# Patient Record
Sex: FEMALE | Race: WHITE | Employment: STUDENT | URBAN - METROPOLITAN AREA
[De-identification: names, ages, dates, MRNs, and addresses within clinical notes are randomized per-mention and may not be internally consistent; named-entity substitution may affect disease eponyms.]

---
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
## 2022-03-08 ENCOUNTER — OFFICE VISIT (OUTPATIENT)
Dept: FAMILY MEDICINE | Facility: CLINIC | Age: 22
End: 2022-03-08
Payer: COMMERCIAL

## 2022-03-08 VITALS
BODY MASS INDEX: 23.56 KG/M2 | RESPIRATION RATE: 16 BRPM | DIASTOLIC BLOOD PRESSURE: 81 MMHG | TEMPERATURE: 97.3 F | SYSTOLIC BLOOD PRESSURE: 120 MMHG | HEART RATE: 66 BPM | WEIGHT: 138 LBS | OXYGEN SATURATION: 100 % | HEIGHT: 64 IN

## 2022-03-08 DIAGNOSIS — J02.9 SORE THROAT: Primary | ICD-10-CM

## 2022-03-08 LAB
DEPRECATED S PYO AG THROAT QL EIA: NEGATIVE
GROUP A STREP BY PCR: NOT DETECTED

## 2022-03-08 PROCEDURE — 87651 STREP A DNA AMP PROBE: CPT | Performed by: PHYSICIAN ASSISTANT

## 2022-03-08 PROCEDURE — 99213 OFFICE O/P EST LOW 20 MIN: CPT | Performed by: PHYSICIAN ASSISTANT

## 2022-03-08 RX ORDER — PREDNISONE 20 MG/1
20 TABLET ORAL 2 TIMES DAILY
Qty: 10 TABLET | Refills: 0 | Status: SHIPPED | OUTPATIENT
Start: 2022-03-08 | End: 2023-07-13

## 2022-03-08 RX ORDER — CETIRIZINE HYDROCHLORIDE 10 MG/1
10 TABLET ORAL DAILY
COMMUNITY
Start: 2022-03-08

## 2022-03-08 ASSESSMENT — PAIN SCALES - GENERAL: PAINLEVEL: NO PAIN (0)

## 2022-03-08 NOTE — PROGRESS NOTES
"    History of Present Illness       Reason for visit:  Tonsilitis  Symptom onset:  More than a month  Symptoms include:  Swollen tonsils  Symptom intensity:  Moderate  Symptom progression:  Worsening  Had these symptoms before:  Yes  Has tried/received treatment for these symptoms:  Yes  Previous treatment was successful:  Yes  Prior treatment description:  Steroidsp  What makes it worse:  No  What makes it better:  Steroids    She eats 2-3 servings of fruits and vegetables daily.She consumes 1 sweetened beverage(s) daily.She exercises with enough effort to increase her heart rate 60 or more minutes per day.  She exercises with enough effort to increase her heart rate 6 days per week.        Pt had strep 4 months ago and again two months ago  Also had strep in 2019  Had a sore throat last month; strep and mono and covid neg.  Has freq colds and sore throats over the winter    Past Medical History:   Diagnosis Date     Known health problems: none      No past surgical history on file.  Social History     Tobacco Use     Smoking status: Never Smoker     Smokeless tobacco: Never Used   Substance Use Topics     Alcohol use: Not Currently     Comment: Rare     Current Outpatient Medications   Medication Sig Dispense Refill     levonorgestrel (KYLEENA) 19.5 MG IUD 1 each by Intrauterine route       predniSONE (DELTASONE) 20 MG tablet Take 1 tablet (20 mg) by mouth 2 times daily 10 tablet 0     Allergies   Allergen Reactions     Penicillins Hives     FAMILY HISTORY NOTED AND REVIEWED  PHYSICAL EXAM:    /81 (BP Location: Right arm, Patient Position: Sitting, Cuff Size: Adult Regular)   Pulse 66   Temp 97.3  F (36.3  C) (Temporal)   Resp 16   Ht 1.615 m (5' 3.6\")   Wt 62.6 kg (138 lb)   SpO2 100%   BMI 23.99 kg/m      Patient appears non toxic  Ears: TMs pearly grey  Throat: erythematous, swollen tonsils without exudates. Erythema post oropharynx.  Neck: tender ant LA    Results for orders placed or performed " in visit on 03/08/22   Streptococcus A Rapid Screen w/Reflex to PCR - Clinic Collect     Status: Normal    Specimen: Throat; Swab   Result Value Ref Range    Group A Strep antigen Negative Negative         Assessment and Plan:     (J02.9) Sore throat  (primary encounter diagnosis)  Comment: pt have recurrent sore throats since age 18 and worse over the past 4 months. Recd she see ENT to discuss tonsillectomy . Referral placed.  She had a neg covid test 3 days ago so didn't repeat that. She had a neg mono test last month so didn't repeat that either.  She has a needle phobia.  Plan: Streptococcus A Rapid Screen w/Reflex to PCR -         Clinic Collect, predniSONE (DELTASONE) 20 MG bid x 5d to help with swelling in throat.        tablet, Otolaryngology Referral        Recd Sukhdev Paige/Huan Collins PA-C

## 2022-04-15 ENCOUNTER — HOSPITAL ENCOUNTER (OUTPATIENT)
Dept: BEHAVIORAL HEALTH | Facility: CLINIC | Age: 22
Discharge: HOME | End: 2022-04-15
Attending: FAMILY MEDICINE
Payer: COMMERCIAL

## 2022-04-15 PROCEDURE — 999N000216 HC STATISTIC ADULT CD FACE TO FACE-NO CHRG: Mod: GT,95 | Performed by: PSYCHOLOGIST

## 2022-04-15 ASSESSMENT — COLUMBIA-SUICIDE SEVERITY RATING SCALE - C-SSRS
1. IN THE PAST MONTH, HAVE YOU WISHED YOU WERE DEAD OR WISHED YOU COULD GO TO SLEEP AND NOT WAKE UP?: YES
5. HAVE YOU STARTED TO WORK OUT OR WORKED OUT THE DETAILS OF HOW TO KILL YOURSELF? DO YOU INTEND TO CARRY OUT THIS PLAN?: NO
2. HAVE YOU ACTUALLY HAD ANY THOUGHTS OF KILLING YOURSELF IN THE PAST MONTH?: NO
3. HAVE YOU BEEN THINKING ABOUT HOW YOU MIGHT KILL YOURSELF?: NO
4. HAVE YOU HAD THESE THOUGHTS AND HAD SOME INTENTION OF ACTING ON THEM?: NO
6. HAVE YOU EVER DONE ANYTHING, STARTED TO DO ANYTHING, OR PREPARED TO DO ANYTHING TO END YOUR LIFE?: NO

## 2022-04-15 ASSESSMENT — ANXIETY QUESTIONNAIRES
2. NOT BEING ABLE TO STOP OR CONTROL WORRYING: SEVERAL DAYS
IF YOU CHECKED OFF ANY PROBLEMS ON THIS QUESTIONNAIRE, HOW DIFFICULT HAVE THESE PROBLEMS MADE IT FOR YOU TO DO YOUR WORK, TAKE CARE OF THINGS AT HOME, OR GET ALONG WITH OTHER PEOPLE: SOMEWHAT DIFFICULT
1. FEELING NERVOUS, ANXIOUS, OR ON EDGE: NEARLY EVERY DAY
GAD7 TOTAL SCORE: 16
4. TROUBLE RELAXING: NEARLY EVERY DAY
7. FEELING AFRAID AS IF SOMETHING AWFUL MIGHT HAPPEN: NEARLY EVERY DAY
6. BECOMING EASILY ANNOYED OR IRRITABLE: MORE THAN HALF THE DAYS
3. WORRYING TOO MUCH ABOUT DIFFERENT THINGS: SEVERAL DAYS
5. BEING SO RESTLESS THAT IT IS HARD TO SIT STILL: NEARLY EVERY DAY

## 2022-04-15 ASSESSMENT — PATIENT HEALTH QUESTIONNAIRE - PHQ9: SUM OF ALL RESPONSES TO PHQ QUESTIONS 1-9: 19

## 2022-04-15 NOTE — PROGRESS NOTES
"[facility]    ADULT Mental Health Assessment Appointment Note    PATIENT'S NAME:  Vanda Collazo    Preferred Pronoun: she her  MRN: [id number]  YOB: 2000  Address:  [address] [place] [id number]  PREFERRED PHONE: [phone]  May we leave a referral related message: Yes    DATE OF SERVICE: 4/15/22  START TIME: 805  END TIME: 850 plus appointment search and scheduling  SERVICE MODALITY:  Video Visit:      Provider verified identity through the following two step process.  Patient provided:  Patient  and Patient address    Telemedicine Visit: The patient's condition can be safely assessed and treated via synchronous audio and visual telemedicine encounter.      Reason for Telemedicine Visit: Services only offered telehealth    Originating Site (Patient Location): Patient's home    Distant Site (Provider Location): [facility]    Consent:  The patient/guardian has verbally consented to: the potential risks and benefits of telemedicine (video visit) versus in person care; bill my insurance or make self-payment for services provided; and responsibility for payment of non-covered services.     Patient would like the video invitation sent by:  My Chart    Mode of Communication:  Video Conference via BeehiveID    As the provider I attest to compliance with applicable laws and regulations related to telemedicine.    Identifying Information:  Patient is a 21 year old, female .  Patient was referred for an assessment by self.  Patient attended the session alone. Patient identified their preferred language to be English. Patient reported they do not need the assistance of an  or other support involved in therapy.     Services Requested:   The reason for seeking services at this time is: \" depression and anxiety \"  Patient has not attempted to resolve these concerns in the past.  Patient does not have " legal concerns.    Mental Health:  Review of Symptoms per patient report:  Depression: Excessive or inappropriate guilt, Change in energy level, Difficulties concentrating, Change in appetite, Psychomotor slowing or agitation, Low self-worth, Irritability, Feeling sad, down, or depressed and Withdrawn  Lori: No Symptoms  Psychosis: No Symptoms  Anxiety: Excessive worry, Nervousness, Poor concentration and Irritability  Panic: had panic attacks in high school but they are rare now.  Post Traumatic Stress Disorder: Experienced traumatic event pt reports she had a traumatic event at the age of 18 and wants to wait to talk to her therapist about that  Eating Disorder: Excessive exercise and binging and purging for a six month period around the age of 18. denies current binge and purge but does engage in excessive exercise occasionally  ADD / ADHD: No symptoms  Conduct Disorder: No symptoms  Autism Spectrum Disorder: No symptoms  Obsessive Compulsive Disorder: No Symptoms    Substance Use:  Do you  have a history of alcohol or illicit drug use? Denies.  CAGE score of 0 and rare use of alcohol and rare use of marijuana.  Pt states she might drink a drink one time every three weeks and same with marijuana.  She denies use of all other chemicals.        Significant Losses / Trauma / Abuse / Neglect Issues:   Patient did not serve in the .  There are indications or report of significant loss, trauma, abuse or neglect issues related to: reports hx of a traumatic event age 18 but did not want to go into it today.  She will discuss with her provider. .  Concerns for possible neglect are not present.     Medical History:  Patient reports no current medical concerns.  Patient denies any issues with pain..   There are not significant appetite / nutritional concerns / weight changes.   Patient does report a history of head injury / trauma / cognitive impairment.  Had a concussion last year.      Current Mental Status Exam:    Appearance:  Appropriate    Eye Contact:  via video   Psychomotor:  Normal   Attitude / Demeanor: Cooperative  Interested Friendly  Speech Rate:  Normal/ Responsive  Volume:  Normal  volume  Language:  intact  Mood:   Normal  Affect:   Appropriate    Thought Content: Clear   Thought Process: Goal Directed  Logical     Associations:  No loosening of associations  Insight:   Good   Judgment:  Intact   Orientation:  All  Attention:  Good    Rating Scales:  PHQ9:    PHQ-9 SCORE 4/15/2022   PHQ-9 Total Score 19   ;    GAD7:    ROBERT-7 SCORE 4/15/2022   Total Score 16       Safety Assessment:   Current Safety Concerns:  [place] Suicide Severity Rating Scale (Lifetime/Recent)  [place] Suicide Severity Rating (Lifetime/Recent) 4/15/2022   Q1 Wished to be Dead (Past Month) yes   Q2 Suicidal Thoughts (Past Month) no   Q3 Suicidal Thought Method no   Q4 Suicidal Intent without Specific Plan no   Q5 Suicide Intent with Specific Plan no   Q6 Suicide Behavior (Lifetime) no   Within the Past 3 Months? no   Level of Risk per Screen low risk     Patient denies current homicidal ideation and behaviors.  Patient denies current self-injurious ideation and behaviors.    Patient denied risk behaviors associated with substance use.  Patient she reports a hx of zoning out when driving at times but feels this has improved associated with mental health symptoms.  Patient reports the following current concerns for their personal safety: None.  Patient reports there are  firearms in the house. The firearms are secured in a locked space.     Clinical Impressions:  Generalized Anxiety Disorder  A. Excessive anxiety and worry about a number of events or activities (such as work or school performance).   B. The person finds it difficult to control the worry.  C. Select 3 or more symptoms (required for diagnosis). Only one item is required in children.   - Restlessness or feeling keyed up or on edge.    - Being easily fatigued.    - Difficulty  concentrating or mind going blank.    - Irritability.    - Sleep disturbance (difficulty falling or staying asleep, or restless unsatisfying sleep).   D. The focus of the anxiety and worry is not confined to features of an Axis I disorder.  E. The anxiety, worry, or physical symptoms cause clinically significant distress or impairment in social, occupational, or other important areas of functioning.   F. The disturbance is not due to the direct physiological effects of a substance (e.g., a drug of abuse, a medication) or a general medical condition (e.g., hyperthyroidism) and does not occur exclusively during a Mood Disorder, a Psychotic Disorder, or a Pervasive Developmental Disorder. Major Depressive Disorder  CRITERIA (A-C) REPRESENT A MAJOR DEPRESSIVE EPISODE - SELECT THESE CRITERIA  A) Recurrent episode(s) - symptoms have been present during the same 2-week period and represent a change from previous functioning 5 or more symptoms (required for diagnosis)   - Depressed mood. Note: In children and adolescents, can be irritable mood.     - Diminished interest or pleasure in all, or almost all, activities.    - Fatigue or loss of energy.    - Feelings of worthlessness or inappropriate and excessive guilt.    - Diminished ability to think or concentrate, or indecisiveness.   B) The symptoms cause clinically significant distress or impairment in social, occupational, or other important areas of functioning  C) The episode is not attributable to the physiological effects of a substance or to another medical condition  D) The occurence of major depressive episode is not better explained by other thought / psychotic disorders  E) There has never been a manic episode or hypomanic episode     Pt reports a hx of binge eating but not current.  This can be evaluated further by her therapist.    Substantiation of Level of Care:  Pt is a 21 year old female with no prior hx of mental health treatment.  She reports onset of  depression and anxiety around age 18 but no family financial ability to pay so she has gone untreated until now having employment.  She graduated from high school and has a full time job.  She reports good family and friend support and continues to live with her parents so her costs are minimal.  Pt reports she is self referred and is specifically looking for therapy and medication.  She wants a female therapist.  She has no comorbid conditions.  She has observed fluctuations in her mood and occasional thoughts of 'negative self talk like I am a burden' but denies SI and denies any risk of harm concerns.  She has no hx of attempts and has never required acute level of care.  Her symptoms are significant as listed above, but she has lived with this level of sxs for three years and is now being proactive to seek help.  She is disclosing and unguarded and appears to be a good candidate for OP LOC.        Therapeutic Interventions Provided: supportive active listening, provided safety planning, explored alternatives, provided Psychoeducational support, emotional validation and encouraged social supports    Recommendations/Plan: recommend OP LOC with therapy and medication assessment.  Pt does not want to see her PCP for meds at this time.    Referrals:   [facility]  (these will be remote and they will send pt a link)  Intake appointments scheduled for  Medication management on April 26 @ 2:30 with Nasreen Ambriz NP  Therapy intake scheduled on May 11 @ 8am with Eunice Saldaña  Mental health clinic in [place], [place]  Located in: [facility]  Address: [facility] [address] [address]  Phone: [phone]    Joy Michael, [facility],  April 15, 2022  [facility]

## 2022-04-15 NOTE — PROGRESS NOTES
[facility]  - Wait list  [address], [address]  [facility]   [phone]      [facility]  Went to   Located in: [facility]  Address: [address], [place] [address]  Phone: [phone]      [facility] [place]  No providers open at this time  Located in: [facility]  Address: [address], [place] [address]  Areas served: [place] and nearby areas  Phone: [phone]      [facility]  Wait list only  Mental health clinic in [place], [place]  Located in: [facility]  Address: [address], [place] [address]  Phone: [phone]    Final appointments that were sent to pt via [employer] [facility]  (these will be remote and they will send pt a link)  Intake appointments scheduled for  Medication management on April 26 @ 2:30 with Nasreen Ambriz, NP  Therapy intake scheduled on May 11 @ 8am with Eunice Saldaña  Mental health clinic in [place], [place]  Located in: [facility]  Address: [address], [place] [address]  Phone: [phone]

## 2022-04-16 ASSESSMENT — ANXIETY QUESTIONNAIRES: GAD7 TOTAL SCORE: 16

## 2022-11-20 ENCOUNTER — HEALTH MAINTENANCE LETTER (OUTPATIENT)
Age: 22
End: 2022-11-20

## 2023-07-13 ENCOUNTER — OFFICE VISIT (OUTPATIENT)
Dept: OBGYN | Facility: CLINIC | Age: 23
End: 2023-07-13
Payer: COMMERCIAL

## 2023-07-13 VITALS
SYSTOLIC BLOOD PRESSURE: 118 MMHG | HEIGHT: 63 IN | BODY MASS INDEX: 26.4 KG/M2 | WEIGHT: 149 LBS | DIASTOLIC BLOOD PRESSURE: 74 MMHG

## 2023-07-13 DIAGNOSIS — Z11.8 SCREENING FOR CHLAMYDIAL DISEASE: ICD-10-CM

## 2023-07-13 DIAGNOSIS — Z20.2 EXPOSURE TO CHLAMYDIA: ICD-10-CM

## 2023-07-13 DIAGNOSIS — N89.8 VAGINAL DISCHARGE: ICD-10-CM

## 2023-07-13 DIAGNOSIS — Z11.3 SCREEN FOR STD (SEXUALLY TRANSMITTED DISEASE): Primary | ICD-10-CM

## 2023-07-13 PROCEDURE — 2894A MULTIPLEX VAGINAL PANEL BY PCR: CPT | Performed by: NURSE PRACTITIONER

## 2023-07-13 PROCEDURE — 0352U MULTIPLEX VAGINAL PANEL BY PCR: CPT | Performed by: NURSE PRACTITIONER

## 2023-07-13 PROCEDURE — 99213 OFFICE O/P EST LOW 20 MIN: CPT | Performed by: NURSE PRACTITIONER

## 2023-07-13 PROCEDURE — 87591 N.GONORRHOEAE DNA AMP PROB: CPT | Performed by: NURSE PRACTITIONER

## 2023-07-13 PROCEDURE — 87491 CHLMYD TRACH DNA AMP PROBE: CPT | Performed by: NURSE PRACTITIONER

## 2023-07-13 RX ORDER — DOXYCYCLINE 100 MG/1
100 CAPSULE ORAL 2 TIMES DAILY
Qty: 14 CAPSULE | Refills: 0 | Status: SHIPPED | OUTPATIENT
Start: 2023-07-13

## 2023-07-13 NOTE — PROGRESS NOTES
SUBJECTIVE:                                                   Vanda Collazo is a 22 year old female who presents to clinic today for the following health issue(s):  Patient presents with:  STD: Screening    HPI:  Patient here today with concerns of chlamydia exposure.  She received a phone call that her partner was diagnosed and treated with chlamydia.  She has a Kyleena IUD that is due for exchange next year but would like to do this sooner than later due to going back to school.  She has had some pelvic cramping but is also due for cycle.    She also has a history of BV and yeast and would like to be screened today.    Patient's last menstrual period was 06/15/2023.    Patient is sexually active, .  Using IUD for contraception.    reports that she has never smoked. She has never used smokeless tobacco.    STD testing offered?  Accepted    Health maintenance updated:  yes    Today's PHQ-2 Score:       3/8/2022     1:42 PM   PHQ-2 (  [employer])   Q1: Little interest or pleasure in doing things 1   Q2: Feeling down, depressed or hopeless 1   PHQ-2 Score 2   Q1: Little interest or pleasure in doing things Several days   Q2: Feeling down, depressed or hopeless Several days   PHQ-2 Score 2     Today's PHQ-9 Score:       4/15/2022     8:00 AM   PHQ-9 SCORE   PHQ-9 Total Score 19     Today's ROBERT-7 Score:       4/15/2022     8:00 AM   ROBERT-7 SCORE   Total Score 16       Problem list and histories reviewed & adjusted, as indicated.  Additional history: as documented.    Patient Active Problem List   Diagnosis     IUD (intrauterine device) in place     No past surgical history on file.   Social History     Tobacco Use     Smoking status: Never     Smokeless tobacco: Never   Substance Use Topics     Alcohol use: Not Currently     Comment: Rare      Problem (# of Occurrences) Relation (Name,Age of Onset)    Diabetes (2) Father, Paternal Grandfather    Hypertension (1) Paternal Grandfather    Coronary Artery Disease  "(1) Paternal Grandfather    Thyroid Cancer (1) Maternal Grandfather            Current Outpatient Medications   Medication Sig     cetirizine (ZYRTEC) 10 MG tablet Take 1 tablet (10 mg) by mouth daily     doxycycline hyclate (VIBRAMYCIN) 100 MG capsule Take 1 capsule (100 mg) by mouth 2 times daily     levonorgestrel (KYLEENA) 19.5 MG IUD 1 each by Intrauterine route     No current facility-administered medications for this visit.     Allergies   Allergen Reactions     Penicillins Hives       ROS:  12 point review of systems negative other than symptoms noted below or in the HPI.  Genitourinary: Vaginal Discharge  No urinary frequency or dysuria, bladder or kidney problems      OBJECTIVE:     /74   Ht 1.607 m (5' 3.25\")   Wt 67.6 kg (149 lb)   LMP 06/15/2023   BMI 26.19 kg/m    Body mass index is 26.19 kg/m .    Exam:  Constitutional:  Appearance: Well nourished, well developed alert, in no acute distress  Psychiatric:  Mentation appears normal and affect normal/bright.  Pelvic Exam:  External Genitalia:     Normal appearance for age, yellow discharge present, no tenderness present, no inflammatory lesions present, color normal  Vagina:    Normal vaginal vault without central or paravaginal defects,/yellow discharge present, no inflammatory lesions present, no masses present  Urethra:   Urethral Meatus:  No erythema or lesions present  Cervix:     Appearance healthy, no lesions present, nontender to palpation, no bleeding present, string present  Perineum:     Perineum within normal limits, no evidence of trauma, no rashes or skin lesions present  Anus:     Anus within normal limits, no hemorrhoids present  Inguinal Lymph Nodes:     No lymphadenopathy present  Pubic Hair:     Normal pubic hair distribution for age  Genitalia and Groin:     No rashes present, no lesions present, no areas of discoloration, no masses present       In-Clinic Test Results:  No results found for this or any previous visit (from " the past 24 hour(s)).    ASSESSMENT/PLAN:                                                        ICD-10-CM    1. Screen for STD (sexually transmitted disease)  Z11.3 NEISSERIA GONORRHOEA PCR      2. Screening for chlamydial disease  Z11.8 CHLAMYDIA TRACHOMATIS PCR      3. Exposure to chlamydia  Z20.2 doxycycline hyclate (VIBRAMYCIN) 100 MG capsule      4. Vaginal discharge  N89.8 Multiplex Vaginal Panel by PCR          There are no Patient Instructions on file for this visit.    22-year-old female with exposure to chlamydia.  We will treat her today and send for culture.  We will also test for yeast and BV and follow-up as appropriate.  We have encouraged her to schedule her IUD exchange on her way out.    Trish Patel, AMAYA CNP  [facility] [place]

## 2023-07-14 LAB
BACTERIAL VAGINOSIS VAG-IMP: NEGATIVE
CANDIDA DNA VAG QL NAA+PROBE: DETECTED
CANDIDA GLABRATA / CANDIDA KRUSEI DNA: NOT DETECTED
T VAGINALIS DNA VAG QL NAA+PROBE: NOT DETECTED

## 2023-07-14 RX ORDER — FLUCONAZOLE 150 MG/1
150 TABLET ORAL ONCE
Qty: 1 TABLET | Refills: 0 | Status: SHIPPED | OUTPATIENT
Start: 2023-07-14 | End: 2023-07-14

## 2023-07-15 LAB
C TRACH DNA SPEC QL NAA+PROBE: POSITIVE
N GONORRHOEA DNA SPEC QL NAA+PROBE: NEGATIVE

## 2023-08-09 ENCOUNTER — OFFICE VISIT (OUTPATIENT)
Dept: OBGYN | Facility: CLINIC | Age: 23
End: 2023-08-09
Payer: COMMERCIAL

## 2023-08-09 VITALS — SYSTOLIC BLOOD PRESSURE: 116 MMHG | BODY MASS INDEX: 26.54 KG/M2 | DIASTOLIC BLOOD PRESSURE: 68 MMHG | WEIGHT: 151 LBS

## 2023-08-09 DIAGNOSIS — N89.8 VAGINAL DISCHARGE: ICD-10-CM

## 2023-08-09 DIAGNOSIS — Z20.2 EXPOSURE TO CHLAMYDIA: Primary | ICD-10-CM

## 2023-08-09 LAB
BACTERIAL VAGINOSIS VAG-IMP: POSITIVE
CANDIDA DNA VAG QL NAA+PROBE: NOT DETECTED
CANDIDA GLABRATA / CANDIDA KRUSEI DNA: NOT DETECTED
T VAGINALIS DNA VAG QL NAA+PROBE: NOT DETECTED

## 2023-08-09 PROCEDURE — 87491 CHLMYD TRACH DNA AMP PROBE: CPT | Performed by: NURSE PRACTITIONER

## 2023-08-09 PROCEDURE — 0352U MULTIPLEX VAGINAL PANEL BY PCR: CPT | Performed by: NURSE PRACTITIONER

## 2023-08-09 PROCEDURE — 87591 N.GONORRHOEAE DNA AMP PROB: CPT | Performed by: NURSE PRACTITIONER

## 2023-08-09 PROCEDURE — 99213 OFFICE O/P EST LOW 20 MIN: CPT | Performed by: NURSE PRACTITIONER

## 2023-08-09 NOTE — PROGRESS NOTES
SUBJECTIVE:                                                   Vanda Collazo is a 22 year old female who presents to clinic today for the following health issue(s):  Patient presents with:  Follow Up      HPI:  Patient here today for chlamydia recheck.  She was tested positive and treated appropriately.  She was also found to have a yeast infection at the same time and we will retest today to be sure that that is cleared.  She does have her menstrual cycle which has been heavier and incredibly more painful than typical cycles.  She does have an IUD in place that is due to be exchanged in February.    Patient's last menstrual period was 06/15/2023..     Patient is sexually active, .  Using IUD for contraception.    reports that she has never smoked. She has never used smokeless tobacco.    STD testing offered?  Accepted    Health maintenance updated:  yes    Today's PHQ-2 Score:       2023     1:16 PM   PHQ-2 (  [employer])   Q1: Little interest or pleasure in doing things 0   Q2: Feeling down, depressed or hopeless 0   PHQ-2 Score 0   Q1: Little interest or pleasure in doing things Not at all   Q2: Feeling down, depressed or hopeless Not at all   PHQ-2 Score 0     Today's PHQ-9 Score:       4/15/2022     8:00 AM   PHQ-9 SCORE   PHQ-9 Total Score 19     Today's ROBERT-7 Score:       4/15/2022     8:00 AM   ROBERT-7 SCORE   Total Score 16       Problem list and histories reviewed & adjusted, as indicated.  Additional history: as documented.    Patient Active Problem List   Diagnosis    IUD (intrauterine device) in place     History reviewed. No pertinent surgical history.   Social History     Tobacco Use    Smoking status: Never    Smokeless tobacco: Never   Substance Use Topics    Alcohol use: Not Currently     Comment: Rare      Problem (# of Occurrences) Relation (Name,Age of Onset)    Diabetes (2) Father, Paternal Grandfather    Hypertension (1) Paternal Grandfather    Coronary Artery Disease (1) Paternal  Grandfather    Thyroid Cancer (1) Maternal Grandfather              Current Outpatient Medications   Medication Sig    cetirizine (ZYRTEC) 10 MG tablet Take 1 tablet (10 mg) by mouth daily    doxycycline hyclate (VIBRAMYCIN) 100 MG capsule Take 1 capsule (100 mg) by mouth 2 times daily    levonorgestrel (KYLEENA) 19.5 MG IUD 1 each by Intrauterine route     No current facility-administered medications for this visit.     Allergies   Allergen Reactions    Penicillins Hives       ROS:  12 point review of systems negative other than symptoms noted below or in the HPI.  No urinary frequency or dysuria, bladder or kidney problems      OBJECTIVE:     /68   Wt 68.5 kg (151 lb)   LMP 06/15/2023   BMI 26.54 kg/m    Body mass index is 26.54 kg/m .    Exam:  Constitutional:  Appearance: Well nourished, well developed alert, in no acute distress  Psychiatric:  Mentation appears normal and affect normal/bright.  Pelvic Exam:  External Genitalia:     Normal appearance for age, no discharge present, no tenderness present, no inflammatory lesions present, color normal  Vagina:    Normal vaginal vault without central or paravaginal defects, no discharge present, no inflammatory lesions present, no masses present.  Blood in vault  Urethra:   Urethral Meatus:  No erythema or lesions present  Cervix:     Appearance healthy, no lesions present, nontender to palpation, menstrual bleeding present, strings barely visualized due to their short length.  Possible at cervical os.   Perineum:     Perineum within normal limits, no evidence of trauma, no rashes or skin lesions present  Anus:     Anus within normal limits, no hemorrhoids present  Inguinal Lymph Nodes:     No lymphadenopathy present  Pubic Hair:     Normal pubic hair distribution for age  Genitalia and Groin:     No rashes present, no lesions present, no areas of discoloration, no masses present     In-Clinic Test Results:  No results found for this or any previous visit  (from the past 24 hour(s)).    ASSESSMENT/PLAN:                                                        ICD-10-CM    1. Exposure to chlamydia  Z20.2 NEISSERIA GONORRHOEA PCR     CHLAMYDIA TRACHOMATIS PCR      2. Vaginal discharge  N89.8 Multiplex Vaginal Panel by PCR          There are no Patient Instructions on file for this visit.    22-year-old female recheck for chlamydia.  Will also send her vaginal culture.    AMAYA Fierro CNP  [facility] [place]

## 2023-08-10 LAB
C TRACH DNA SPEC QL NAA+PROBE: NEGATIVE
N GONORRHOEA DNA SPEC QL NAA+PROBE: NEGATIVE

## 2023-08-10 RX ORDER — METRONIDAZOLE 500 MG/1
500 TABLET ORAL 2 TIMES DAILY
Qty: 14 TABLET | Refills: 0 | Status: SHIPPED | OUTPATIENT
Start: 2023-08-10 | End: 2023-08-17

## 2023-11-25 ENCOUNTER — HEALTH MAINTENANCE LETTER (OUTPATIENT)
Age: 23
End: 2023-11-25

## 2024-01-05 ENCOUNTER — TELEPHONE (OUTPATIENT)
Dept: MIDWIFE SERVICES | Facility: CLINIC | Age: 24
End: 2024-01-05
Payer: COMMERCIAL

## 2024-01-05 ENCOUNTER — MYC MEDICAL ADVICE (OUTPATIENT)
Dept: OBGYN | Facility: CLINIC | Age: 24
End: 2024-01-05
Payer: COMMERCIAL

## 2024-01-05 NOTE — TELEPHONE ENCOUNTER
Pt coming in for kyleena replacement  Routing pt Playfirehart message to provider to advise.  Binta Lugo RN on 1/5/2024 at 2:58 PM

## 2024-01-05 NOTE — TELEPHONE ENCOUNTER
Reason for call:  Other   Patient called regarding (reason for call): call back  Additional comments: patient would like medication prescribed before her IUD appointment on Tuesday 1/9. Said she had them last time she got her IUD placed but can't remember the names. Please call back.     Phone number to reach patient:  Cell number on file:    Telephone Information:   Mobile [phone]     Best Time:  any    Can we leave a detailed message on this number?  YES    Travel screening: Not Applicable

## 2024-01-06 NOTE — CONFIDENTIAL NOTE
[employer] message returned to Ani. Reviewed previous records, took Xanax and Ibuprofen prior to last IUD insertion. Discussed anti-anxiety is not necessary for IUD insertion but an option if she feels this would be beneficial (generally we Rx vistaril). If she would like an anti-anxiety medication she should let us know. Informed she will need a  if she takes anti-anxiety med    AMAYA Meraz, RUBÉN

## 2024-01-09 ENCOUNTER — OFFICE VISIT (OUTPATIENT)
Dept: MIDWIFE SERVICES | Facility: CLINIC | Age: 24
End: 2024-01-09
Payer: COMMERCIAL

## 2024-01-09 VITALS
HEIGHT: 63 IN | SYSTOLIC BLOOD PRESSURE: 108 MMHG | DIASTOLIC BLOOD PRESSURE: 66 MMHG | BODY MASS INDEX: 27.11 KG/M2 | WEIGHT: 153 LBS

## 2024-01-09 DIAGNOSIS — Z30.433 ENCOUNTER FOR REMOVAL AND REINSERTION OF INTRAUTERINE CONTRACEPTIVE DEVICE: Primary | ICD-10-CM

## 2024-01-09 DIAGNOSIS — Z11.51 SCREENING FOR HPV (HUMAN PAPILLOMAVIRUS): ICD-10-CM

## 2024-01-09 DIAGNOSIS — Z97.5 IUD (INTRAUTERINE DEVICE) IN PLACE: ICD-10-CM

## 2024-01-09 PROCEDURE — G0145 SCR C/V CYTO,THINLAYER,RESCR: HCPCS | Performed by: ADVANCED PRACTICE MIDWIFE

## 2024-01-09 PROCEDURE — 58301 REMOVE INTRAUTERINE DEVICE: CPT | Performed by: ADVANCED PRACTICE MIDWIFE

## 2024-01-09 PROCEDURE — 58300 INSERT INTRAUTERINE DEVICE: CPT | Performed by: ADVANCED PRACTICE MIDWIFE

## 2024-01-09 PROCEDURE — G0124 SCREEN C/V THIN LAYER BY MD: HCPCS | Performed by: PATHOLOGY

## 2024-01-09 ASSESSMENT — ANXIETY QUESTIONNAIRES

## 2024-01-09 ASSESSMENT — PATIENT HEALTH QUESTIONNAIRE - PHQ9
SUM OF ALL RESPONSES TO PHQ QUESTIONS 1-9: 0
5. POOR APPETITE OR OVEREATING: NOT AT ALL

## 2024-01-09 NOTE — PATIENT INSTRUCTIONS
Your Intrauterine Device (IUD)     What to watch for right after IUD placement:    Some women may experience uterine cramps, bleeding, and/or dizziness during and right after IUD placement.     To help minimize the cramps, you may take ibuprofen 600 mg with food. These symptoms should improve over the next 24 hours.  Mild cramping may be present for a few days after IUD placement. You may continue taking ibuprofen as directed if needed.    You may experience spotting or bleeding for the first few weeks to months after IUD placement.      Other side effects can include:  Anemia, hemorrhage, partial or complete expulsion of device, uterine or cervical perforation, embedding of IUD in the uterine wall, increased risk of pelvic inflammatory disease.  Women who become pregnant with an IUD in place are at higher risk of an ectopic pregnancy.  There is also a higher risk of miscarriage when pregnancy occurs with an IUD in place.    Use condoms or abstain from sex for 7 days after the insertion of your Mirena or Kyleena or Ana Maria  IUD.  This is not necessary if you had a ParaGard IUD placed.    If you experience fever, chills, abdominal pain, worsening pelvic pain, dizziness, unusually heavy vaginal bleeding, suspected expulsion of device or foul smelling vaginal discharge please call the clinic for evaluation.    Please schedule an appointment at the clinic for a string check 4-6 weeks after IUD placement    Your periods may change (Ana Maria/Kyleena/Mirena):    For the first 3 to 6 months, your monthly period may become irregular. You may also have frequent spotting or light bleeding. A few women have heavy bleeding during this time. After your body adjusts, the number of bleeding days is likely to decrease (but may remain irregular), and you may even find that your periods stop altogether for as long as Ana Maria/Mirena is in place.  Your periods will return rapidly once the IUD is removed.      ParaGard IUD users:    ParaGard  IUD users may experience heavier than normal cycles while their IUD is in place, this is considered normal   Back-up contraception is not needed      Checking for your strings:    We encourage everyone with an IUD in place to check for their strings monthly    You may check your own IUD strings by inserting a finger into the vagina and feeling the strings as they exit the cervix.  The strings will initially feel firm, like fishing line, but will soften over a few weeks.  After the strings have softened, you or your partner should not be able to feel the strings during intercourse.     If the string length greatly changes or if you cannot feel your strings at all please make an appointment to see you midwife and use a backup method of contraception like a condom.    If you can feel something hard/plastic like the IUD may not be in the correct place. You should then see your healthcare provider to have the position confirmed with ultrasound.       Remember:    IUD's do not protect against HIV or STIs.  IUD's do not prevent the formation of ovarian cysts.  IUD's do not typically reduce acne or cause weight gain or mood changes.    For more information:  [email or web address]-us.[email or web address]    The ParaGard IUD is effective for 12 years.  The Ana Maria IUD is effective for 3 years.  The Kyleena IUD is effective for 5 years.  The Mirena IUD is effective for 8 years.  Your IUD can easily be removed by a healthcare professional at any time.    Do not try to remove the IUD yourself.      If you have questions or concerns please call:    [facility]   [phone]

## 2024-01-09 NOTE — PROGRESS NOTES
SUBJECTIVE:    Is a pregnancy test required: No.  Was a consent obtained?  Yes    Subjective: aVnda Collazo is a 23 year old  presents for IUD and desires Kyleena  type IUD. Current periods are  regular, every 4th of the month. Lasts 3-4, light in flow  She requests removal of the IUD because the IUD effectiveness has     Patient has been given the opportunity to ask questions about all forms of birth control, including all options appropriate for Vanda Collazo. Discussed that no method of birth control, except abstinence is 100% effective against pregnancy or sexually transmitted infection.     Vanda Collazo understands she may have the IUD removed at any time. IUD should be removed by a health care provider and the current IUD will be removed today.    The entire removal and insertion procedure was reviewed with the patient, including care after placement.    Today's PHQ-2 Score:       2023     1:16 PM   PHQ-2 (  [employer])   Q1: Little interest or pleasure in doing things 0   Q2: Feeling down, depressed or hopeless 0   PHQ-2 Score 0   Q1: Little interest or pleasure in doing things Not at all   Q2: Feeling down, depressed or hopeless Not at all   PHQ-2 Score 0       PROCEDURE:    Premedicated with ibuprofen. 800 mg prior to arrival  A speculum exam was performed and the cervix was visualized. Pap smear was collected. The IUD string was not initially visualized. Pap brush unsuccessful at retrieving strings. IUD finder successful. Using ring forceps, the string was grasped and the IUD removed intact.    Under sterile technique, cervix was visualized with speculum and prepped with Betadine solution swab x 3. Tenaculum was placed for stability. The uterus was gently straightened and sounded to 7.0 cm. IUD prepared for placement, and IUD inserted according to 's instructions without difficulty or significant ressitance, and deployed at the fundus. The strings were visualized and  trimmed to 2.5 cm from the external os. Tenaculum was removed and hemostasis noted. Speculum removed.  Patient tolerated procedure well with some cramping. Tearful during tenaculum placement    Lot # [id number]  Exp: 12/2025    EBL: minimal    Complications: none      POST PROCEDURE:    Given 's handouts, including when to have IUD removed, list of danger s/sx, side effects and follow up recommended. Encouraged condom use for prevention of STD. Advised to call for any fever, for prolonged or severe pain or bleeding, abnormal vaginal dischage, or unable to palpate strings. She was advised to use pain medications (ibuprofen) as needed for mild to moderate pain. Advised to follow-up in clinic in 4-6 weeks for IUD string check if unable to find strings or as directed by provider.     Will MyChart with Pap results. Aware if normal result, will not need again for 3 yrs    Offered Covid/Flu/HPV vaccine. Declines today    Denies STD screening today    AMAYA Bales CNM

## 2024-01-12 LAB
BKR LAB AP GYN ADEQUACY: ABNORMAL
BKR LAB AP GYN INTERPRETATION: ABNORMAL
BKR LAB AP HPV REFLEX: NO
BKR LAB AP PREVIOUS ABNORMAL: ABNORMAL
PATH REPORT.COMMENTS IMP SPEC: ABNORMAL
PATH REPORT.COMMENTS IMP SPEC: ABNORMAL
PATH REPORT.RELEVANT HX SPEC: ABNORMAL

## 2024-01-15 ENCOUNTER — PATIENT OUTREACH (OUTPATIENT)
Dept: MIDWIFE SERVICES | Facility: CLINIC | Age: 24
End: 2024-01-15

## 2024-01-15 PROBLEM — R87.610 ATYPICAL SQUAMOUS CELLS OF UNDETERMINED SIGNIFICANCE (ASCUS) ON PAPANICOLAOU SMEAR OF CERVIX: Status: ACTIVE | Noted: 2024-01-15

## 2025-01-04 ENCOUNTER — HEALTH MAINTENANCE LETTER (OUTPATIENT)
Age: 25
End: 2025-01-04